# Patient Record
Sex: FEMALE | Race: WHITE | Employment: FULL TIME | URBAN - METROPOLITAN AREA
[De-identification: names, ages, dates, MRNs, and addresses within clinical notes are randomized per-mention and may not be internally consistent; named-entity substitution may affect disease eponyms.]

---

## 2022-10-14 ENCOUNTER — HOSPITAL ENCOUNTER (INPATIENT)
Age: 42
LOS: 1 days | Discharge: HOME | DRG: 343 | End: 2022-10-15
Attending: STUDENT IN AN ORGANIZED HEALTH CARE EDUCATION/TRAINING PROGRAM | Admitting: SURGERY
Payer: COMMERCIAL

## 2022-10-14 DIAGNOSIS — K35.80 ACUTE APPENDICITIS, UNSPECIFIED ACUTE APPENDICITIS TYPE: Primary | ICD-10-CM

## 2022-10-14 LAB
BASOPHILS ABSOLUTE: 0 K/UL (ref 0–0.2)
BASOPHILS RELATIVE PERCENT: 0.2 %
EOSINOPHILS ABSOLUTE: 0.1 K/UL (ref 0–0.6)
EOSINOPHILS RELATIVE PERCENT: 1 %
HCT VFR BLD CALC: 40.2 % (ref 36–48)
HEMOGLOBIN: 13.3 G/DL (ref 12–16)
LYMPHOCYTES ABSOLUTE: 1.5 K/UL (ref 1–5.1)
LYMPHOCYTES RELATIVE PERCENT: 11.3 %
MCH RBC QN AUTO: 29.8 PG (ref 26–34)
MCHC RBC AUTO-ENTMCNC: 32.9 G/DL (ref 31–36)
MCV RBC AUTO: 90.4 FL (ref 80–100)
MONOCYTES ABSOLUTE: 0.6 K/UL (ref 0–1.3)
MONOCYTES RELATIVE PERCENT: 4.6 %
NEUTROPHILS ABSOLUTE: 10.8 K/UL (ref 1.7–7.7)
NEUTROPHILS RELATIVE PERCENT: 82.9 %
PDW BLD-RTO: 12.9 % (ref 12.4–15.4)
PLATELET # BLD: 235 K/UL (ref 135–450)
PMV BLD AUTO: 9.3 FL (ref 5–10.5)
RBC # BLD: 4.45 M/UL (ref 4–5.2)
WBC # BLD: 13.1 K/UL (ref 4–11)

## 2022-10-14 PROCEDURE — 96375 TX/PRO/DX INJ NEW DRUG ADDON: CPT

## 2022-10-14 PROCEDURE — 84703 CHORIONIC GONADOTROPIN ASSAY: CPT

## 2022-10-14 PROCEDURE — 6360000002 HC RX W HCPCS: Performed by: STUDENT IN AN ORGANIZED HEALTH CARE EDUCATION/TRAINING PROGRAM

## 2022-10-14 PROCEDURE — 96374 THER/PROPH/DIAG INJ IV PUSH: CPT

## 2022-10-14 PROCEDURE — 2580000003 HC RX 258: Performed by: STUDENT IN AN ORGANIZED HEALTH CARE EDUCATION/TRAINING PROGRAM

## 2022-10-14 PROCEDURE — 80048 BASIC METABOLIC PNL TOTAL CA: CPT

## 2022-10-14 PROCEDURE — 85025 COMPLETE CBC W/AUTO DIFF WBC: CPT

## 2022-10-14 PROCEDURE — 99285 EMERGENCY DEPT VISIT HI MDM: CPT

## 2022-10-14 RX ORDER — ONDANSETRON 2 MG/ML
4 INJECTION INTRAMUSCULAR; INTRAVENOUS ONCE
Status: COMPLETED | OUTPATIENT
Start: 2022-10-15 | End: 2022-10-15

## 2022-10-14 RX ORDER — SODIUM CHLORIDE, SODIUM LACTATE, POTASSIUM CHLORIDE, AND CALCIUM CHLORIDE .6; .31; .03; .02 G/100ML; G/100ML; G/100ML; G/100ML
1000 INJECTION, SOLUTION INTRAVENOUS ONCE
Status: COMPLETED | OUTPATIENT
Start: 2022-10-14 | End: 2022-10-15

## 2022-10-14 RX ORDER — ONDANSETRON 2 MG/ML
4 INJECTION INTRAMUSCULAR; INTRAVENOUS ONCE
Status: COMPLETED | OUTPATIENT
Start: 2022-10-14 | End: 2022-10-14

## 2022-10-14 RX ADMIN — ONDANSETRON 4 MG: 2 INJECTION INTRAMUSCULAR; INTRAVENOUS at 23:29

## 2022-10-14 RX ADMIN — SODIUM CHLORIDE, POTASSIUM CHLORIDE, SODIUM LACTATE AND CALCIUM CHLORIDE 1000 ML: 600; 310; 30; 20 INJECTION, SOLUTION INTRAVENOUS at 23:31

## 2022-10-14 RX ADMIN — HYDROMORPHONE HYDROCHLORIDE 1 MG: 1 INJECTION, SOLUTION INTRAMUSCULAR; INTRAVENOUS; SUBCUTANEOUS at 23:30

## 2022-10-14 ASSESSMENT — PAIN DESCRIPTION - LOCATION
LOCATION: ABDOMEN

## 2022-10-14 ASSESSMENT — PAIN DESCRIPTION - PAIN TYPE
TYPE: ACUTE PAIN
TYPE: ACUTE PAIN

## 2022-10-14 ASSESSMENT — PAIN SCALES - GENERAL
PAINLEVEL_OUTOF10: 10
PAINLEVEL_OUTOF10: 5
PAINLEVEL_OUTOF10: 10

## 2022-10-14 ASSESSMENT — PAIN DESCRIPTION - DESCRIPTORS
DESCRIPTORS: DULL
DESCRIPTORS: PRESSURE
DESCRIPTORS: ACHING

## 2022-10-14 ASSESSMENT — PAIN DESCRIPTION - FREQUENCY
FREQUENCY: CONTINUOUS
FREQUENCY: CONTINUOUS

## 2022-10-14 ASSESSMENT — PAIN - FUNCTIONAL ASSESSMENT: PAIN_FUNCTIONAL_ASSESSMENT: 0-10

## 2022-10-15 ENCOUNTER — APPOINTMENT (OUTPATIENT)
Dept: CT IMAGING | Age: 42
DRG: 343 | End: 2022-10-15
Payer: COMMERCIAL

## 2022-10-15 ENCOUNTER — ANESTHESIA (OUTPATIENT)
Dept: OPERATING ROOM | Age: 42
DRG: 343 | End: 2022-10-15
Payer: COMMERCIAL

## 2022-10-15 ENCOUNTER — ANESTHESIA EVENT (OUTPATIENT)
Dept: OPERATING ROOM | Age: 42
DRG: 343 | End: 2022-10-15
Payer: COMMERCIAL

## 2022-10-15 VITALS
WEIGHT: 135 LBS | BODY MASS INDEX: 21.69 KG/M2 | OXYGEN SATURATION: 97 % | HEART RATE: 65 BPM | HEIGHT: 66 IN | SYSTOLIC BLOOD PRESSURE: 98 MMHG | RESPIRATION RATE: 14 BRPM | DIASTOLIC BLOOD PRESSURE: 63 MMHG | TEMPERATURE: 98.2 F

## 2022-10-15 PROBLEM — K35.80 ACUTE APPENDICITIS: Status: ACTIVE | Noted: 2022-10-15

## 2022-10-15 LAB
ALBUMIN SERPL-MCNC: 3.8 G/DL (ref 3.4–5)
ANION GAP SERPL CALCULATED.3IONS-SCNC: 12 MMOL/L (ref 3–16)
ANION GAP SERPL CALCULATED.3IONS-SCNC: 9 MMOL/L (ref 3–16)
BASOPHILS ABSOLUTE: 0 K/UL (ref 0–0.2)
BASOPHILS RELATIVE PERCENT: 0.1 %
BILIRUBIN URINE: NEGATIVE
BLOOD, URINE: NEGATIVE
BUN BLDV-MCNC: 11 MG/DL (ref 7–20)
BUN BLDV-MCNC: 13 MG/DL (ref 7–20)
CALCIUM SERPL-MCNC: 8.9 MG/DL (ref 8.3–10.6)
CALCIUM SERPL-MCNC: 9.3 MG/DL (ref 8.3–10.6)
CHLORIDE BLD-SCNC: 103 MMOL/L (ref 99–110)
CHLORIDE BLD-SCNC: 105 MMOL/L (ref 99–110)
CLARITY: CLEAR
CO2: 24 MMOL/L (ref 21–32)
CO2: 26 MMOL/L (ref 21–32)
COLOR: YELLOW
CREAT SERPL-MCNC: 0.7 MG/DL (ref 0.6–1.1)
CREAT SERPL-MCNC: 0.8 MG/DL (ref 0.6–1.1)
EOSINOPHILS ABSOLUTE: 0 K/UL (ref 0–0.6)
EOSINOPHILS RELATIVE PERCENT: 0 %
GFR AFRICAN AMERICAN: >60
GFR AFRICAN AMERICAN: >60
GFR NON-AFRICAN AMERICAN: >60
GFR NON-AFRICAN AMERICAN: >60
GLUCOSE BLD-MCNC: 120 MG/DL (ref 70–99)
GLUCOSE BLD-MCNC: 127 MG/DL (ref 70–99)
GLUCOSE URINE: NEGATIVE MG/DL
HCG QUALITATIVE: NEGATIVE
HCT VFR BLD CALC: 39.1 % (ref 36–48)
HEMOGLOBIN: 12.9 G/DL (ref 12–16)
KETONES, URINE: NEGATIVE MG/DL
LEUKOCYTE ESTERASE, URINE: NEGATIVE
LYMPHOCYTES ABSOLUTE: 0.5 K/UL (ref 1–5.1)
LYMPHOCYTES RELATIVE PERCENT: 4.3 %
MAGNESIUM: 1.8 MG/DL (ref 1.8–2.4)
MCH RBC QN AUTO: 29.8 PG (ref 26–34)
MCHC RBC AUTO-ENTMCNC: 33 G/DL (ref 31–36)
MCV RBC AUTO: 90.3 FL (ref 80–100)
MICROSCOPIC EXAMINATION: NORMAL
MONOCYTES ABSOLUTE: 0.4 K/UL (ref 0–1.3)
MONOCYTES RELATIVE PERCENT: 3.5 %
NEUTROPHILS ABSOLUTE: 10.8 K/UL (ref 1.7–7.7)
NEUTROPHILS RELATIVE PERCENT: 92.1 %
NITRITE, URINE: NEGATIVE
PDW BLD-RTO: 13.2 % (ref 12.4–15.4)
PH UA: 6.5 (ref 5–8)
PHOSPHORUS: 4.1 MG/DL (ref 2.5–4.9)
PLATELET # BLD: 201 K/UL (ref 135–450)
PMV BLD AUTO: 9.2 FL (ref 5–10.5)
POTASSIUM REFLEX MAGNESIUM: 4.2 MMOL/L (ref 3.5–5.1)
POTASSIUM SERPL-SCNC: 4.6 MMOL/L (ref 3.5–5.1)
PROTEIN UA: NEGATIVE MG/DL
RBC # BLD: 4.34 M/UL (ref 4–5.2)
SODIUM BLD-SCNC: 139 MMOL/L (ref 136–145)
SODIUM BLD-SCNC: 140 MMOL/L (ref 136–145)
SPECIFIC GRAVITY UA: 1.01 (ref 1–1.03)
URINE REFLEX TO CULTURE: NORMAL
URINE TYPE: NORMAL
UROBILINOGEN, URINE: 0.2 E.U./DL
WBC # BLD: 11.7 K/UL (ref 4–11)

## 2022-10-15 PROCEDURE — 0DTJ4ZZ RESECTION OF APPENDIX, PERCUTANEOUS ENDOSCOPIC APPROACH: ICD-10-PCS | Performed by: SURGERY

## 2022-10-15 PROCEDURE — 2500000003 HC RX 250 WO HCPCS: Performed by: FAMILY MEDICINE

## 2022-10-15 PROCEDURE — G0378 HOSPITAL OBSERVATION PER HR: HCPCS

## 2022-10-15 PROCEDURE — 6360000002 HC RX W HCPCS: Performed by: FAMILY MEDICINE

## 2022-10-15 PROCEDURE — 2709999900 HC NON-CHARGEABLE SUPPLY: Performed by: SURGERY

## 2022-10-15 PROCEDURE — 6360000002 HC RX W HCPCS: Performed by: STUDENT IN AN ORGANIZED HEALTH CARE EDUCATION/TRAINING PROGRAM

## 2022-10-15 PROCEDURE — 6370000000 HC RX 637 (ALT 250 FOR IP): Performed by: ANESTHESIOLOGY

## 2022-10-15 PROCEDURE — 6360000002 HC RX W HCPCS

## 2022-10-15 PROCEDURE — 44970 LAPAROSCOPY APPENDECTOMY: CPT | Performed by: SURGERY

## 2022-10-15 PROCEDURE — 3600000004 HC SURGERY LEVEL 4 BASE: Performed by: SURGERY

## 2022-10-15 PROCEDURE — 94150 VITAL CAPACITY TEST: CPT

## 2022-10-15 PROCEDURE — 6360000002 HC RX W HCPCS: Performed by: ANESTHESIOLOGY

## 2022-10-15 PROCEDURE — 81003 URINALYSIS AUTO W/O SCOPE: CPT

## 2022-10-15 PROCEDURE — 96372 THER/PROPH/DIAG INJ SC/IM: CPT

## 2022-10-15 PROCEDURE — 3600000014 HC SURGERY LEVEL 4 ADDTL 15MIN: Performed by: SURGERY

## 2022-10-15 PROCEDURE — 88304 TISSUE EXAM BY PATHOLOGIST: CPT

## 2022-10-15 PROCEDURE — 6370000000 HC RX 637 (ALT 250 FOR IP): Performed by: FAMILY MEDICINE

## 2022-10-15 PROCEDURE — 6360000004 HC RX CONTRAST MEDICATION

## 2022-10-15 PROCEDURE — 1200000000 HC SEMI PRIVATE

## 2022-10-15 PROCEDURE — 3700000000 HC ANESTHESIA ATTENDED CARE: Performed by: SURGERY

## 2022-10-15 PROCEDURE — 2580000003 HC RX 258: Performed by: SURGERY

## 2022-10-15 PROCEDURE — 74177 CT ABD & PELVIS W/CONTRAST: CPT

## 2022-10-15 PROCEDURE — 80069 RENAL FUNCTION PANEL: CPT

## 2022-10-15 PROCEDURE — 96365 THER/PROPH/DIAG IV INF INIT: CPT

## 2022-10-15 PROCEDURE — A4217 STERILE WATER/SALINE, 500 ML: HCPCS | Performed by: SURGERY

## 2022-10-15 PROCEDURE — 6360000002 HC RX W HCPCS: Performed by: SURGERY

## 2022-10-15 PROCEDURE — 96376 TX/PRO/DX INJ SAME DRUG ADON: CPT

## 2022-10-15 PROCEDURE — 83735 ASSAY OF MAGNESIUM: CPT

## 2022-10-15 PROCEDURE — 85025 COMPLETE CBC W/AUTO DIFF WBC: CPT

## 2022-10-15 PROCEDURE — 3700000001 HC ADD 15 MINUTES (ANESTHESIA): Performed by: SURGERY

## 2022-10-15 PROCEDURE — 2580000003 HC RX 258: Performed by: STUDENT IN AN ORGANIZED HEALTH CARE EDUCATION/TRAINING PROGRAM

## 2022-10-15 PROCEDURE — 36415 COLL VENOUS BLD VENIPUNCTURE: CPT

## 2022-10-15 PROCEDURE — 7100000001 HC PACU RECOVERY - ADDTL 15 MIN: Performed by: SURGERY

## 2022-10-15 PROCEDURE — 7100000000 HC PACU RECOVERY - FIRST 15 MIN: Performed by: SURGERY

## 2022-10-15 PROCEDURE — 2500000003 HC RX 250 WO HCPCS: Performed by: SURGERY

## 2022-10-15 PROCEDURE — 99223 1ST HOSP IP/OBS HIGH 75: CPT | Performed by: SURGERY

## 2022-10-15 RX ORDER — SODIUM CHLORIDE 9 MG/ML
INJECTION, SOLUTION INTRAVENOUS PRN
Status: DISCONTINUED | OUTPATIENT
Start: 2022-10-15 | End: 2022-10-15 | Stop reason: HOSPADM

## 2022-10-15 RX ORDER — OXYCODONE HYDROCHLORIDE 5 MG/1
5 TABLET ORAL PRN
Status: CANCELLED | OUTPATIENT
Start: 2022-10-15 | End: 2022-10-15

## 2022-10-15 RX ORDER — SCOLOPAMINE TRANSDERMAL SYSTEM 1 MG/1
1 PATCH, EXTENDED RELEASE TRANSDERMAL ONCE
Status: DISCONTINUED | OUTPATIENT
Start: 2022-10-15 | End: 2022-10-15 | Stop reason: HOSPADM

## 2022-10-15 RX ORDER — LIDOCAINE HYDROCHLORIDE 20 MG/ML
INJECTION, SOLUTION INTRAVENOUS PRN
Status: DISCONTINUED | OUTPATIENT
Start: 2022-10-15 | End: 2022-10-15 | Stop reason: SDUPTHER

## 2022-10-15 RX ORDER — PROCHLORPERAZINE EDISYLATE 5 MG/ML
5 INJECTION INTRAMUSCULAR; INTRAVENOUS
Status: CANCELLED | OUTPATIENT
Start: 2022-10-15 | End: 2022-10-16

## 2022-10-15 RX ORDER — OXYCODONE HYDROCHLORIDE 5 MG/1
2.5 TABLET ORAL EVERY 6 HOURS PRN
Qty: 10 TABLET | Refills: 0 | Status: SHIPPED | OUTPATIENT
Start: 2022-10-15 | End: 2022-10-20

## 2022-10-15 RX ORDER — MEPERIDINE HYDROCHLORIDE 25 MG/ML
12.5 INJECTION INTRAMUSCULAR; INTRAVENOUS; SUBCUTANEOUS EVERY 5 MIN PRN
Status: DISCONTINUED | OUTPATIENT
Start: 2022-10-15 | End: 2022-10-15 | Stop reason: HOSPADM

## 2022-10-15 RX ORDER — ONDANSETRON 2 MG/ML
4 INJECTION INTRAMUSCULAR; INTRAVENOUS EVERY 6 HOURS PRN
Status: DISCONTINUED | OUTPATIENT
Start: 2022-10-15 | End: 2022-10-15 | Stop reason: HOSPADM

## 2022-10-15 RX ORDER — BUPIVACAINE HYDROCHLORIDE 5 MG/ML
INJECTION, SOLUTION EPIDURAL; INTRACAUDAL PRN
Status: DISCONTINUED | OUTPATIENT
Start: 2022-10-15 | End: 2022-10-15 | Stop reason: ALTCHOICE

## 2022-10-15 RX ORDER — SODIUM CHLORIDE 0.9 % (FLUSH) 0.9 %
5-40 SYRINGE (ML) INJECTION PRN
Status: CANCELLED | OUTPATIENT
Start: 2022-10-15

## 2022-10-15 RX ORDER — OXYCODONE HYDROCHLORIDE 5 MG/1
10 TABLET ORAL PRN
Status: CANCELLED | OUTPATIENT
Start: 2022-10-15 | End: 2022-10-15

## 2022-10-15 RX ORDER — SODIUM CHLORIDE 9 MG/ML
25 INJECTION, SOLUTION INTRAVENOUS PRN
Status: CANCELLED | OUTPATIENT
Start: 2022-10-15

## 2022-10-15 RX ORDER — LORAZEPAM 2 MG/ML
0.5 INJECTION INTRAMUSCULAR
Status: CANCELLED | OUTPATIENT
Start: 2022-10-15 | End: 2022-10-16

## 2022-10-15 RX ORDER — ROCURONIUM BROMIDE 10 MG/ML
INJECTION, SOLUTION INTRAVENOUS PRN
Status: DISCONTINUED | OUTPATIENT
Start: 2022-10-15 | End: 2022-10-15 | Stop reason: SDUPTHER

## 2022-10-15 RX ORDER — MEPERIDINE HYDROCHLORIDE 25 MG/ML
12.5 INJECTION INTRAMUSCULAR; INTRAVENOUS; SUBCUTANEOUS EVERY 5 MIN PRN
Status: CANCELLED | OUTPATIENT
Start: 2022-10-15

## 2022-10-15 RX ORDER — ENOXAPARIN SODIUM 100 MG/ML
40 INJECTION SUBCUTANEOUS DAILY
Status: DISCONTINUED | OUTPATIENT
Start: 2022-10-15 | End: 2022-10-15 | Stop reason: HOSPADM

## 2022-10-15 RX ORDER — OXYCODONE HYDROCHLORIDE 5 MG/1
2.5 TABLET ORAL EVERY 4 HOURS PRN
Status: DISCONTINUED | OUTPATIENT
Start: 2022-10-15 | End: 2022-10-15 | Stop reason: HOSPADM

## 2022-10-15 RX ORDER — SODIUM CHLORIDE, SODIUM LACTATE, POTASSIUM CHLORIDE, CALCIUM CHLORIDE 600; 310; 30; 20 MG/100ML; MG/100ML; MG/100ML; MG/100ML
INJECTION, SOLUTION INTRAVENOUS CONTINUOUS
Status: DISCONTINUED | OUTPATIENT
Start: 2022-10-15 | End: 2022-10-15 | Stop reason: HOSPADM

## 2022-10-15 RX ORDER — PROPOFOL 10 MG/ML
INJECTION, EMULSION INTRAVENOUS PRN
Status: DISCONTINUED | OUTPATIENT
Start: 2022-10-15 | End: 2022-10-15 | Stop reason: SDUPTHER

## 2022-10-15 RX ORDER — ONDANSETRON 4 MG/1
4 TABLET, ORALLY DISINTEGRATING ORAL EVERY 8 HOURS PRN
Qty: 9 TABLET | Refills: 0 | Status: SHIPPED | OUTPATIENT
Start: 2022-10-15 | End: 2022-10-18

## 2022-10-15 RX ORDER — HEPARIN SODIUM 5000 [USP'U]/ML
5000 INJECTION, SOLUTION INTRAVENOUS; SUBCUTANEOUS ONCE
Status: DISCONTINUED | OUTPATIENT
Start: 2022-10-15 | End: 2022-10-15 | Stop reason: SDUPTHER

## 2022-10-15 RX ORDER — DIPHENHYDRAMINE HYDROCHLORIDE 50 MG/ML
12.5 INJECTION INTRAMUSCULAR; INTRAVENOUS
Status: CANCELLED | OUTPATIENT
Start: 2022-10-15 | End: 2022-10-16

## 2022-10-15 RX ORDER — SUCCINYLCHOLINE/SOD CL,ISO/PF 200MG/10ML
SYRINGE (ML) INTRAVENOUS PRN
Status: DISCONTINUED | OUTPATIENT
Start: 2022-10-15 | End: 2022-10-15 | Stop reason: SDUPTHER

## 2022-10-15 RX ORDER — FENTANYL CITRATE 50 UG/ML
25 INJECTION, SOLUTION INTRAMUSCULAR; INTRAVENOUS EVERY 5 MIN PRN
Status: CANCELLED | OUTPATIENT
Start: 2022-10-15

## 2022-10-15 RX ORDER — DEXAMETHASONE SODIUM PHOSPHATE 4 MG/ML
INJECTION, SOLUTION INTRA-ARTICULAR; INTRALESIONAL; INTRAMUSCULAR; INTRAVENOUS; SOFT TISSUE PRN
Status: DISCONTINUED | OUTPATIENT
Start: 2022-10-15 | End: 2022-10-15 | Stop reason: SDUPTHER

## 2022-10-15 RX ORDER — SODIUM CHLORIDE 0.9 % (FLUSH) 0.9 %
5-40 SYRINGE (ML) INJECTION EVERY 12 HOURS SCHEDULED
Status: DISCONTINUED | OUTPATIENT
Start: 2022-10-15 | End: 2022-10-15 | Stop reason: HOSPADM

## 2022-10-15 RX ORDER — OXYCODONE HYDROCHLORIDE 5 MG/1
5 TABLET ORAL EVERY 4 HOURS PRN
Status: DISCONTINUED | OUTPATIENT
Start: 2022-10-15 | End: 2022-10-15 | Stop reason: HOSPADM

## 2022-10-15 RX ORDER — LABETALOL HYDROCHLORIDE 5 MG/ML
10 INJECTION, SOLUTION INTRAVENOUS
Status: CANCELLED | OUTPATIENT
Start: 2022-10-15

## 2022-10-15 RX ORDER — PROMETHAZINE HYDROCHLORIDE 25 MG/1
12.5 TABLET ORAL EVERY 6 HOURS PRN
Status: DISCONTINUED | OUTPATIENT
Start: 2022-10-15 | End: 2022-10-15 | Stop reason: HOSPADM

## 2022-10-15 RX ORDER — ONDANSETRON 2 MG/ML
4 INJECTION INTRAMUSCULAR; INTRAVENOUS
Status: CANCELLED | OUTPATIENT
Start: 2022-10-15 | End: 2022-10-16

## 2022-10-15 RX ORDER — ONDANSETRON 2 MG/ML
INJECTION INTRAMUSCULAR; INTRAVENOUS PRN
Status: DISCONTINUED | OUTPATIENT
Start: 2022-10-15 | End: 2022-10-15 | Stop reason: SDUPTHER

## 2022-10-15 RX ORDER — SODIUM CHLORIDE 0.9 % (FLUSH) 0.9 %
5-40 SYRINGE (ML) INJECTION EVERY 12 HOURS SCHEDULED
Status: CANCELLED | OUTPATIENT
Start: 2022-10-15

## 2022-10-15 RX ORDER — ACETAMINOPHEN 500 MG
1000 TABLET ORAL EVERY 6 HOURS
Status: DISCONTINUED | OUTPATIENT
Start: 2022-10-15 | End: 2022-10-15 | Stop reason: HOSPADM

## 2022-10-15 RX ORDER — SODIUM CHLORIDE 9 MG/ML
INJECTION, SOLUTION INTRAVENOUS CONTINUOUS
Status: CANCELLED | OUTPATIENT
Start: 2022-10-15

## 2022-10-15 RX ORDER — FENTANYL CITRATE 50 UG/ML
INJECTION, SOLUTION INTRAMUSCULAR; INTRAVENOUS PRN
Status: DISCONTINUED | OUTPATIENT
Start: 2022-10-15 | End: 2022-10-15 | Stop reason: SDUPTHER

## 2022-10-15 RX ORDER — MIDAZOLAM HYDROCHLORIDE 1 MG/ML
INJECTION INTRAMUSCULAR; INTRAVENOUS PRN
Status: DISCONTINUED | OUTPATIENT
Start: 2022-10-15 | End: 2022-10-15 | Stop reason: SDUPTHER

## 2022-10-15 RX ORDER — ONDANSETRON 4 MG/1
4 TABLET, ORALLY DISINTEGRATING ORAL EVERY 8 HOURS PRN
Status: DISCONTINUED | OUTPATIENT
Start: 2022-10-15 | End: 2022-10-15 | Stop reason: HOSPADM

## 2022-10-15 RX ORDER — SODIUM CHLORIDE 0.9 % (FLUSH) 0.9 %
5-40 SYRINGE (ML) INJECTION PRN
Status: DISCONTINUED | OUTPATIENT
Start: 2022-10-15 | End: 2022-10-15 | Stop reason: HOSPADM

## 2022-10-15 RX ORDER — ONDANSETRON 2 MG/ML
4 INJECTION INTRAMUSCULAR; INTRAVENOUS ONCE
Status: COMPLETED | OUTPATIENT
Start: 2022-10-15 | End: 2022-10-15

## 2022-10-15 RX ORDER — SODIUM CHLORIDE 0.9 % (FLUSH) 0.9 %
10 SYRINGE (ML) INJECTION EVERY 12 HOURS SCHEDULED
Status: DISCONTINUED | OUTPATIENT
Start: 2022-10-15 | End: 2022-10-15 | Stop reason: HOSPADM

## 2022-10-15 RX ORDER — PROCHLORPERAZINE EDISYLATE 5 MG/ML
10 INJECTION INTRAMUSCULAR; INTRAVENOUS EVERY 6 HOURS PRN
Status: DISCONTINUED | OUTPATIENT
Start: 2022-10-15 | End: 2022-10-15 | Stop reason: HOSPADM

## 2022-10-15 RX ORDER — MAGNESIUM HYDROXIDE 1200 MG/15ML
LIQUID ORAL CONTINUOUS PRN
Status: COMPLETED | OUTPATIENT
Start: 2022-10-15 | End: 2022-10-15

## 2022-10-15 RX ORDER — SODIUM CHLORIDE 0.9 % (FLUSH) 0.9 %
10 SYRINGE (ML) INJECTION PRN
Status: DISCONTINUED | OUTPATIENT
Start: 2022-10-15 | End: 2022-10-15 | Stop reason: HOSPADM

## 2022-10-15 RX ADMIN — PROCHLORPERAZINE EDISYLATE 10 MG: 5 INJECTION INTRAMUSCULAR; INTRAVENOUS at 04:12

## 2022-10-15 RX ADMIN — SODIUM CHLORIDE, POTASSIUM CHLORIDE, SODIUM LACTATE AND CALCIUM CHLORIDE: 600; 310; 30; 20 INJECTION, SOLUTION INTRAVENOUS at 03:32

## 2022-10-15 RX ADMIN — HYDROMORPHONE HYDROCHLORIDE 0.5 MG: 1 INJECTION, SOLUTION INTRAMUSCULAR; INTRAVENOUS; SUBCUTANEOUS at 02:49

## 2022-10-15 RX ADMIN — LIDOCAINE HYDROCHLORIDE 100 MG: 20 INJECTION, SOLUTION INTRAVENOUS at 08:47

## 2022-10-15 RX ADMIN — PROPOFOL 120 MG: 10 INJECTION, EMULSION INTRAVENOUS at 08:48

## 2022-10-15 RX ADMIN — MIDAZOLAM HYDROCHLORIDE 2 MG: 2 INJECTION, SOLUTION INTRAMUSCULAR; INTRAVENOUS at 08:46

## 2022-10-15 RX ADMIN — ONDANSETRON 4 MG: 2 INJECTION INTRAMUSCULAR; INTRAVENOUS at 08:53

## 2022-10-15 RX ADMIN — PIPERACILLIN AND TAZOBACTAM 3375 MG: 3; .375 INJECTION, POWDER, FOR SOLUTION INTRAVENOUS at 11:13

## 2022-10-15 RX ADMIN — FENTANYL CITRATE 50 MCG: 50 INJECTION, SOLUTION INTRAMUSCULAR; INTRAVENOUS at 09:21

## 2022-10-15 RX ADMIN — ONDANSETRON 4 MG: 2 INJECTION INTRAMUSCULAR; INTRAVENOUS at 00:07

## 2022-10-15 RX ADMIN — PIPERACILLIN AND TAZOBACTAM 3375 MG: 3; .375 INJECTION, POWDER, FOR SOLUTION INTRAVENOUS at 01:48

## 2022-10-15 RX ADMIN — ENOXAPARIN SODIUM 40 MG: 100 INJECTION SUBCUTANEOUS at 11:13

## 2022-10-15 RX ADMIN — ROCURONIUM BROMIDE 30 MG: 10 INJECTION INTRAVENOUS at 08:53

## 2022-10-15 RX ADMIN — MEPERIDINE HYDROCHLORIDE 12.5 MG: 25 INJECTION INTRAMUSCULAR; INTRAVENOUS; SUBCUTANEOUS at 10:15

## 2022-10-15 RX ADMIN — DEXAMETHASONE SODIUM PHOSPHATE 4 MG: 4 INJECTION, SOLUTION INTRAMUSCULAR; INTRAVENOUS at 08:53

## 2022-10-15 RX ADMIN — FENTANYL CITRATE 50 MCG: 50 INJECTION, SOLUTION INTRAMUSCULAR; INTRAVENOUS at 08:46

## 2022-10-15 RX ADMIN — SUGAMMADEX 200 MG: 100 INJECTION, SOLUTION INTRAVENOUS at 09:20

## 2022-10-15 RX ADMIN — IOPAMIDOL 5 ML: 755 INJECTION, SOLUTION INTRAVENOUS at 00:35

## 2022-10-15 RX ADMIN — ONDANSETRON 4 MG: 2 INJECTION INTRAMUSCULAR; INTRAVENOUS at 02:11

## 2022-10-15 RX ADMIN — Medication 100 MG: at 08:48

## 2022-10-15 ASSESSMENT — PAIN DESCRIPTION - DESCRIPTORS
DESCRIPTORS: ACHING
DESCRIPTORS: ACHING

## 2022-10-15 ASSESSMENT — PAIN DESCRIPTION - LOCATION
LOCATION: ABDOMEN

## 2022-10-15 ASSESSMENT — PAIN SCALES - GENERAL
PAINLEVEL_OUTOF10: 0
PAINLEVEL_OUTOF10: 7
PAINLEVEL_OUTOF10: 3
PAINLEVEL_OUTOF10: 3
PAINLEVEL_OUTOF10: 0
PAINLEVEL_OUTOF10: 4

## 2022-10-15 NOTE — PROGRESS NOTES
4 Eyes Admission Assessment     I agree as the admission nurse that 2 RN's have performed a thorough Head to Toe Skin Assessment on the patient. ALL assessment sites listed below have been assessed on admission. Areas assessed by both nurses:   [x]   Head, Face, and Ears   [x]   Shoulders, Back, and Chest  [x]   Arms, Elbows, and Hands   [x]   Coccyx, Sacrum, and Ischium  [x]   Legs, Feet, and Heels        Does the Patient have Skin Breakdown?   No         Raymon Prevention initiated:  No   Wound Care Orders initiated:  No      [facility] nurse consulted for Pressure Injury (Stage 3,4, Unstageable, DTI, NWPT, and Complex wounds) or Raymon score 18 or lower:  No      Nurse 1 eSignature: Electronically signed by Aiden Aragon RN on 10/15/22 at 4:33 AM EDT        Nurse 2 eSignature: Electronically signed by Kartik Goodman RN on 10/15/22 at 4:35 AM EDT

## 2022-10-15 NOTE — ED NOTES
Attempted to call report, nurse unable to take report at this time.       Viry Gunter, RN  10/15/22 1372

## 2022-10-15 NOTE — PROGRESS NOTES
VSS, A&O, pt's nausea was managed with compazine. Pt denies pain, no vomiting. IVF infusing, pt has been NPO, ambulatory and independent. Call light within reach. Uneventful night with pt.

## 2022-10-15 NOTE — BRIEF OP NOTE
Brief Postoperative Note      Patient: Nitesh Godoy  YOB: 1980  MRN: [id number]    Date of Procedure: 10/15/2022    Pre-Op Diagnosis: Acute appendicitis    Post-Op Diagnosis: Same       Procedure(s):  APPENDECTOMY LAPAROSCOPIC    Surgeon(s):  Jagjit Rodriguez MD    Assistant:  Resident: Flora London MD    Anesthesia: General    Estimated Blood Loss (mL): Minimal    Complications: None    Specimens:   ID Type Source Tests Collected by Time Destination   A : APPENDIX Tissue Tissue SURGICAL PATHOLOGY Jagjit Rodriguez MD 10/15/2022 0080        Implants:  * No implants in log *      Drains:   NG/OG/NJ/NE Tube Orogastric 18 fr Center mouth (Active)       Findings: Mildly inflamed appendix; right-sided ovarian cyst present, moderate amount of pelvic fluid    Electronically signed by Flora London MD on 10/15/2022 at 9:35 AM

## 2022-10-15 NOTE — PROGRESS NOTES
Pt has been a&o x4 for shift. Pt V/s stable following appendectomy procedure. Pt denies any pain or nausea and voiding freely. Ambulating with steady gait. Pt d/c instructions and prescriptions reviewed and given to pt. PIV removed intact and w/o injury.  Pt left unit in w/c, transportation home provided by S/O.

## 2022-10-15 NOTE — ED PROVIDER NOTES
ED Attending Attestation Note     Date of evaluation: 10/14/2022    This patient was seen by the resident. I have seen and examined the patient, agree with the workup, evaluation, management and diagnosis. The care plan has been discussed. My assessment reveals 61-year-old female presenting with right lower quadrant abdominal pain that started about 2 hours ago. Patient with pain that she rates as a 10 out of 10. Its worse with movement and palpation. She has had some nausea but denies any vomiting at this time. She denies any fevers or chills. She states that her last bowel movement was earlier today. Patient with her last menstrual cycle a few weeks ago. On examination she has tenderness palpation in the periumbilical and right lower quadrant region. Upon arrival noted to be afebrile but significantly uncomfortable and will have blood work and cross-sectional imaging done to assess for appendicitis.      Valery Arenas MD  10/14/22 0843

## 2022-10-15 NOTE — ED PROVIDER NOTES
[address] [facility] RESIDENT NOTE       Date of evaluation: 10/14/2022    Chief Complaint     Abdominal Pain (Patient states sudden onset lower abd pain. Denies urinary/bowel sx. States pain severe pressure. )      History of Present Illness     Christopher Diaz is a 46 y.o. female who presents to the [facility] Ed with complaints of abdominal pain going on for the past 2 hrs. As per the patient she had her dinner and was doing well until about 2 hrs ago when she started having sharp pain in her lower abdomen associated with chills. She denies any nausea, vomiting, diarrhea or constipation, fevers. Her last menstrual cycle was about 3 weeks ago and she generally doesn't have pain associated with her periods. She denies any other acute complaints at this time. She denies eating outside or anyone having similar symptoms around her. Review of Systems     Review of Systems   All other systems reviewed and are negative. Past Medical, Surgical, Family, and Social History     She has no past medical history on file. She has a past surgical history that includes Thyroidectomy. Her family history is not on file. She reports that she has never smoked. She has never used smokeless tobacco. She reports that she does not use drugs. Medications     Previous Medications    No medications on file       Allergies     She has No Known Allergies. Physical Exam     INITIAL VITALS: BP: 120/86, Temp: 97.6 °F (36.4 °C), Heart Rate: 77, Resp: 19, SpO2: 100 %   Physical Exam  Constitutional:       Appearance: She is well-developed. HENT:      Head: Normocephalic and atraumatic. Mouth/Throat:      Mouth: Mucous membranes are moist.   Eyes:      Extraocular Movements: Extraocular movements intact. Pupils: Pupils are equal, round, and reactive to light. Cardiovascular:      Rate and Rhythm: Normal rate and regular rhythm.    Pulmonary:      Effort: Pulmonary effort is normal.      Breath sounds: Normal breath sounds. Abdominal:      General: Abdomen is flat. Palpations: Abdomen is soft. Tenderness: There is abdominal tenderness in the suprapubic area. Skin:     General: Skin is warm. Neurological:      General: No focal deficit present. Mental Status: She is alert. Psychiatric:         Mood and Affect: Mood normal.         Behavior: Behavior normal.       Diagnostic Results       RADIOLOGY:  CT ABDOMEN PELVIS W IV CONTRAST Additional Contrast? None    (Results Pending)       LABS:   Results for orders placed or performed during the hospital encounter of 10/14/22   CBC with Auto Differential   Result Value Ref Range    WBC 13.1 (H) 4.0 - 11.0 K/uL    RBC 4.45 4.00 - 5.20 M/uL    Hemoglobin 13.3 12.0 - 16.0 g/dL    Hematocrit 40.2 36.0 - 48.0 %    MCV 90.4 80.0 - 100.0 fL    MCH 29.8 26.0 - 34.0 pg    MCHC 32.9 31.0 - 36.0 g/dL    RDW 12.9 12.4 - 15.4 %    Platelets 925 547 - 707 K/uL    MPV 9.3 5.0 - 10.5 fL    Neutrophils % 82.9 %    Lymphocytes % 11.3 %    Monocytes % 4.6 %    Eosinophils % 1.0 %    Basophils % 0.2 %    Neutrophils Absolute 10.8 (H) 1.7 - 7.7 K/uL    Lymphocytes Absolute 1.5 1.0 - 5.1 K/uL    Monocytes Absolute 0.6 0.0 - 1.3 K/uL    Eosinophils Absolute 0.1 0.0 - 0.6 K/uL    Basophils Absolute 0.0 0.0 - 0.2 K/uL   BMP w/ Reflex to MG   Result Value Ref Range    Sodium 140 136 - 145 mmol/L    Potassium reflex Magnesium 4.2 3.5 - 5.1 mmol/L    Chloride 105 99 - 110 mmol/L    CO2 26 21 - 32 mmol/L    Anion Gap 9 3 - 16    Glucose 120 (H) 70 - 99 mg/dL    BUN 13 7 - 20 mg/dL    Creatinine 0.8 0.6 - 1.1 mg/dL    GFR Non-African American >60 >60    GFR African American >60 >60    Calcium 9.3 8.3 - 10.6 mg/dL   HCG Qualitative, Serum   Result Value Ref Range    hCG Qual Negative Detects HCG level >10 MIU/mL       ED BEDSIDE ULTRASOUND:  No results found.     RECENT VITALS:  BP: 124/83, Temp: 97.6 °F (36.4 °C),Heart Rate: 77, Resp: 19, SpO2: 100 % Procedures     None     ED Course     Nursing Notes, Past Medical Hx, Past Surgical Hx, Social Hx, Allergies, and FamilyHx were reviewed. The patient was giventhe following medications:  Orders Placed This Encounter   Medications    HYDROmorphone (DILAUDID) injection 1 mg    ondansetron (ZOFRAN) injection 4 mg    lactated ringers bolus    ondansetron (ZOFRAN) injection 4 mg    iopamidol (ISOVUE-370) 76 % injection 5 mL       CONSULTS:  None    MEDICAL DECISION MAKING / ASSESSMENT / Sonia Staff is a 46 y.o. female who presents to the [facility] Ed with complaints of abdominal pain going on for the past 2 hrs. As per the patient she had her dinner and was doing well until about 2 hrs ago when she started having sharp pain in her lower abdomen associated with chills. She denies any nausea, vomiting, diarrhea or constipation, fevers. Her last menstrual cycle was about 3 weeks ago and she generally doesn't have pain associated with her periods. She denies any other acute complaints at this time. She denies eating outside or anyone having similar symptoms around her. On arrival to the Ed patient is alert and oriented x 4. Vitals are stable and is afebrile. On my exam patient did have tenderness to palpation in the suprapubic and periumbilical area. Clear chest on auscultation, no flank tenderness or lower extremity edema. No neuro deficits noted. Labs including CBC, CMP, urine pregnancy test were unremarkable. Patient was given Zofran to help with nausea and pain medications along with bolus of fluids. CT abdomen pelvis w contrast was done to rule out intraabdominal pathology which is pending. At this time of shift change, Dr Fernanda Horton will follow up with results and decide on treatment and disposition of the patient. This patient was also evaluated by the attending physician. All care plans were discussed and agreed upon. Clinical Impression     1.  Periumbilical abdominal pain Disposition     PATIENT REFERRED TO:  No follow-up provider specified.     DISCHARGE MEDICATIONS:  New Prescriptions    No medications on file       Barby Eduardo MD  Resident  10/15/22 0103

## 2022-10-15 NOTE — PROGRESS NOTES
General Surgery  Post-operative Note      Procedure(s) Performed:   Laparoscopic appendectomy    Subjective:   Patient's pain is controlled, denies nausea or vomiting. Tolerating diet. OOB, ambulating and voiding appropriately. Feels comfortable going home. Objective:    Vitals:   Vitals:    10/15/22 1000 10/15/22 1015 10/15/22 1032 10/15/22 1232   BP: 113/75 111/73 95/62 98/63   Pulse: 64 70 75 65   Resp: 12 14 12 14   Temp:  97 °F (36.1 °C) 97.9 °F (36.6 °C) 98.2 °F (36.8 °C)   TempSrc:  Temporal Axillary Oral   SpO2: 100% 99% 97% 97%   Weight:       Height:           Physical Exam:  Post-op vital signs:  Stable   General appearance: no acute distress, pleasant demeanor   Eyes: No scleral icterus, EOM grossly intact  Neck: trachea midline, no JVD,  Chest/Lungs: Normal effort with no accessory muscle use on RA  Cardiovascular: RRR  Abdomen: Soft, appropriately-tender, incision c/d/i and well approximated with Dermabond  Skin: warm and dry, no rashes  Extremities: no edema, no cyanosis  Neuro: A&Ox3, no focal deficits, sensation intact    Assessment and Plan  This is a 46y.o. year old female status post laparoscopic appendectomy. 10/15. She is HDS with minimal pain, OOB, voiding, without n/v, thus suitable for d/c to home this afternoon.      Pain management: Oxycodone and acetaminophen PRN   Cardiovasc: hemodynamically stable  Respiratory: deep breathing  Fluids:  None Diet: General diet  : Urine output is adequate   Ambulation: ambulation as tolerates  Prophylaxis: none  Antibiotics: none  Wound: Local wound care    Marrian Lesches, DO   PGY1, General Surgery  10/15/22  3:54 PM  Pager # [phone]

## 2022-10-15 NOTE — DISCHARGE INSTRUCTIONS
Discharge Instructions:    Diet:   You may resume a regular diet. Wound Care:   Skin glue was used to cover your incision(s). Please note that this glue is tinted purple; therefore, a slight purple hue around your incisions is normal. The skin glue will fall off on its own in about 10 days. You may shower, but do not scrub the incision sites directly or soak (tub, pool, etc.). Activity:   No heavy lifting greater than a milk jug (~8lbs) until follow up, at which time you will receive further instruction. Pain management:   Unless informed of any restrictions by your primary care physician, please use your preferred over-the-counter pain reliever as your primary pain medication. If you have pain that persists despite over-the-counter pain medications, you have been provided with a prescription for an opioid/narcotic pain reliever (Oxycodone). No driving or operating machinery while taking opioid/narcotic medications. If you are not taking the opioid pain medication, then you can drive when you feel as though you can sit comfortably behind the steering wheel and can slam on the brake or turn the wheel sharply without it hurting. We recommend that you practice this while sitting the car with it parked in your driveway before trying to drive on the road. Reasons to Return:   Some soreness and redness is common after surgery, especially for the first 24-48 hours. If, however, you experience for increasing redness, worsening pain, new and/or increasing drainage from wound, fever above 101.5 degrees Farenheit, bleeding that does not stop soon after discovery, or any other concerns about your incision or post op course, please either call the office or call/return to the Emergency Department for further evaluation. Follow up with Dr. Mehdi Bonilla as needed in the future.  Please call the office to make an appointment or if you have any questions/concerns: [phone]  We recommend that you call your primary care physician within the first 3-5 days following discharge to inform them that you were recently hospitalized and potentially schedule a visit at their discretion.

## 2022-10-15 NOTE — ANESTHESIA PRE PROCEDURE
Department of Anesthesiology  Preprocedure Note       Name:  Keri Sylvester   Age:  46 y.o.  :  1980                                          MRN:  [id number]         Date:  10/15/2022      Surgeon: Shane Trevino):  Trevor Beckwith MD    Procedure: Procedure(s):  APPENDECTOMY LAPAROSCOPIC POSSIBLE OPEN    Medications prior to admission:   Prior to Admission medications    Not on File       Current medications:    Current Facility-Administered Medications   Medication Dose Route Frequency Provider Last Rate Last Admin    sodium chloride flush 0.9 % injection 10 mL  10 mL IntraVENous 2 times per day Blas Kidd MD        sodium chloride flush 0.9 % injection 10 mL  10 mL IntraVENous PRN Blas Kidd MD        0.9 % sodium chloride infusion   IntraVENous PRN Blas Kidd MD        ondansetron (ZOFRAN-ODT) disintegrating tablet 4 mg  4 mg Oral Q8H PRN Blas Kidd MD        Or    ondansetron (ZOFRAN) injection 4 mg  4 mg IntraVENous Q6H PRN Blas Kidd MD        enoxaparin (LOVENOX) injection 40 mg  40 mg SubCUTAneous Daily Blas Kidd MD        lactated ringers infusion   IntraVENous Continuous Blas Kidd  mL/hr at 10/15/22 0332 New Bag at 10/15/22 0332    HYDROmorphone (DILAUDID) injection 0.25 mg  0.25 mg IntraVENous Q3H PRN Blas Kidd MD        Or    HYDROmorphone (DILAUDID) injection 0.5 mg  0.5 mg IntraVENous Q3H PRN Blas Kidd MD   0.5 mg at 10/15/22 0249    piperacillin-tazobactam (ZOSYN) 3,375 mg in dextrose 5 % 50 mL IVPB extended infusion (mini-bag)  3,375 mg IntraVENous Q8H Blas Kidd MD        prochlorperazine (COMPAZINE) injection 10 mg  10 mg IntraVENous Q6H PRN Sheri Armas DO   10 mg at 10/15/22 6846       Allergies:  No Known Allergies    Problem List:    Patient Active Problem List   Diagnosis Code    Acute appendicitis K35.80       Past Medical History:  History reviewed. No pertinent past medical history.     Past Surgical History:        Procedure Laterality Date    THYROIDECTOMY      partial       Social History:    Social History     Tobacco Use    Smoking status: Never    Smokeless tobacco: Never   Substance Use Topics    Alcohol use: Not on file                                Counseling given: Not Answered      Vital Signs (Current):   Vitals:    10/15/22 0111 10/15/22 0323 10/15/22 0617 10/15/22 0721   BP: 124/77 110/72  (!) 101/59   Pulse:  78  81   Resp:  18  16   Temp:  97.9 °F (36.6 °C)  98.6 °F (37 °C)   TempSrc:  Oral  Oral   SpO2: 98% 97% 97% 96%   Weight:       Height:                                                  BP Readings from Last 3 Encounters:   10/15/22 (!) 101/59       NPO Status:                                                                                 BMI:   Wt Readings from Last 3 Encounters:   10/14/22 135 lb (61.2 kg)     Body mass index is 21.79 kg/m². CBC:   Lab Results   Component Value Date/Time    WBC 11.7 10/15/2022 06:03 AM    RBC 4.34 10/15/2022 06:03 AM    HGB 12.9 10/15/2022 06:03 AM    HCT 39.1 10/15/2022 06:03 AM    MCV 90.3 10/15/2022 06:03 AM    RDW 13.2 10/15/2022 06:03 AM     10/15/2022 06:03 AM       CMP:   Lab Results   Component Value Date/Time     10/15/2022 06:03 AM    K 4.6 10/15/2022 06:03 AM    K 4.2 10/14/2022 11:42 PM     10/15/2022 06:03 AM    CO2 24 10/15/2022 06:03 AM    BUN 11 10/15/2022 06:03 AM    CREATININE 0.7 10/15/2022 06:03 AM    GFRAA >60 10/15/2022 06:03 AM    LABGLOM >60 10/15/2022 06:03 AM    GLUCOSE 127 10/15/2022 06:03 AM    CALCIUM 8.9 10/15/2022 06:03 AM       POC Tests: No results for input(s): POCGLU, POCNA, POCK, POCCL, POCBUN, POCHEMO, POCHCT in the last 72 hours.     Coags: No results found for: PROTIME, INR, APTT    HCG (If Applicable): No results found for: PREGTESTUR, PREGSERUM, HCG, HCGQUANT     ABGs: No results found for: PHART, PO2ART, RGW9QEJ, SUU5WER, BEART, T5ZUKEKM     Type & Screen (If Applicable):  No results found for: Jeovanny Gonzales    Drug/Infectious Status (If Applicable):  No results found for: HIV, HEPCAB    COVID-19 Screening (If Applicable): No results found for: COVID19        Anesthesia Evaluation    Airway: Mallampati: II  TM distance: >3 FB   Neck ROM: full  Mouth opening: > = 3 FB   Dental:          Pulmonary:                              Cardiovascular:            Rhythm: regular  Rate: normal                    Neuro/Psych:               GI/Hepatic/Renal:             Endo/Other:                     Abdominal:             Vascular: Other Findings:           Anesthesia Plan      general     ASA 2 - emergent       Induction: intravenous. Anesthetic plan and risks discussed with patient. Plan discussed with CRNA.                     Renee Martínez MD   10/15/2022

## 2022-10-15 NOTE — ED NOTES
Report called to Law Esparza on floor, patient to floor via stretcher.       Milli Dunlap RN  10/15/22 6616

## 2022-10-15 NOTE — H&P
General Surgery   Resident History and Physical Note    CC: RLQ sharp abdominal pain    History of Present Illness:   Kimmy Mesa is a 46 y.o. female with no significant past medical history presents to [facility] with 1 day of sharp abdominal pain that was generalized and has become more localized to the periumbilical region and RLQ. Endorses associated chills. The patient admits to severe nausea and vomiting, worse after pain medication in the ED, she denies fever. She has never had a colonoscopy before, stated she has sigmoidoscopy before related to perineal tear due to vaginal birth years ago. This is the first time she has ever had pain of this nature before. She last had something to eat around 7 pm. Denies hx of inflammatory bowel disease. Denies family hx of colorectal surgery. Past Medical History:    History reviewed. No pertinent past medical history. Past Surgical History:        Procedure Laterality Date    THYROIDECTOMY      partial   Ovarian cystectomy, laparoscopic. Allergies:  Patient has no known allergies. Medications:   Home Meds  No current facility-administered medications on file prior to encounter. No current outpatient medications on file prior to encounter. Current Meds  piperacillin-tazobactam (ZOSYN) 3,375 mg in dextrose 5 % 50 mL IVPB (mini-bag), Once        Family History:   History reviewed. No pertinent family history. Social History:   TOBACCO:   reports that she has never smoked. She has never used smokeless tobacco.  ETOH:   has no history on file for alcohol use. DRUGS:   reports no history of drug use. Review of Systems:   A 14 point review of systems was conducted, significant findings as noted in HPI. All other systems negative.      Physical exam:    Vitals:    10/14/22 2310 10/14/22 2311 10/15/22 0041 10/15/22 0111   BP: 120/82 120/82 124/83 124/77   Pulse: 77      Resp: 19      Temp: 97.6 °F (36.4 °C)      TempSrc: Oral      SpO2: 100%  100% 98% Weight: 135 lb (61.2 kg)      Height: 5' 6\" (1.676 m)          General appearance: alert, no acute distress, grooming appropriate  Eyes: No scleral icterus, EOM grossly intact  Neck: trachea midline, no JVD, no lymphadenopathy, neck supple  Chest/Lungs: Normal effort with no accessory muscle use on RA  Cardiovascular: RRR  Abdomen: Soft, mild tenderness in the RLQ and Infraumbilical region, no rebound, guarding, or rigidity  Skin: warm and dry, no rashes  Extremities: no edema, no cyanosis  Neuro: A&Ox3, no focal deficits, sensation intact    Labs:    CBC:   Recent Labs     10/14/22  2342   WBC 13.1*   HGB 13.3   HCT 40.2   MCV 90.4        BMP:   Recent Labs     10/14/22  2342      K 4.2      CO2 26   BUN 13   CREATININE 0.8     PT/INR: No results for input(s): PROTIME, INR in the last 72 hours. APTT: No results for input(s): APTT in the last 72 hours. Liver Profile: No results found for: AST, ALT, ALB, BILIDIR, BILITOT, ALKPHOS, GGT, 5NUCNo results found for: CHOL, HDL, TRIG  UA:   Lab Results   Component Value Date/Time    COLORU Yellow 10/15/2022 01:02 AM    PHUR 6.5 10/15/2022 01:02 AM    CLARITYU Clear 10/15/2022 01:02 AM    SPECGRAV 1.015 10/15/2022 01:02 AM    LEUKOCYTESUR Negative 10/15/2022 01:02 AM    UROBILINOGEN 0.2 10/15/2022 01:02 AM    BILIRUBINUR Negative 10/15/2022 01:02 AM    BLOODU Negative 10/15/2022 01:02 AM    GLUCOSEU Negative 10/15/2022 01:02 AM       Imaging:   CT ABDOMEN PELVIS W IV CONTRAST Additional Contrast? None   Final Result      1. No sign of any free fluid mass or stone disease appreciated. 2. Appendix has minimal thickening measuring 7.5 mm without obstruction, correlate for early appendicitis   3. No sign of any bowel obstruction   4. Involuting follicle or cyst on the right ovary measuring 2.0 x 1.6 cm               Assessment/Plan:   This is a 46 y.o. female with no significant past medical history presents to [facility] ED with 1 days of sharp abdominal pain that is now localized to her RLQ. CT scan findings concerning for appendicitis without perforation, she has a leukocytosis of 13.1 but is otherwise afebrile and HDS.     - Will admit the patient to the surgical service  - Will plan for laparoscopic appendectomy in the morning  - Patient to remain NPO at this time  - IV pain medication for pain control  - IV Zosyn  - IVF for hydration  - Pregnancy test - negative  - Consent obtained and placed in the main OR desk  - Patient will be discussed with Dr. Solis Cook DO  10/15/22  2:15 AM

## 2022-10-15 NOTE — ANESTHESIA POSTPROCEDURE EVALUATION
Department of Anesthesiology  Postprocedure Note    Patient: Gabino Cordova  MRN: [id number]  YOB: 1980  Date of evaluation: 10/15/2022      Procedure Summary     Date: 10/15/22 Room / Location: [address] [place] [address]    Anesthesia Start: 9850 Anesthesia Stop: 0812    Procedure: APPENDECTOMY LAPAROSCOPIC (Abdomen) Diagnosis: Acute appendicitis, unspecified acute appendicitis type    Surgeons: Geovany Aleman MD Responsible Provider: Earnstine Saint, MD    Anesthesia Type: general ASA Status: 2 - Emergent          Anesthesia Type: No value filed.     Garth Phase I:      Garth Phase II:        Anesthesia Post Evaluation    Patient location during evaluation: PACU  Level of consciousness: awake  Complications: no  Multimodal analgesia pain management approach

## 2022-10-15 NOTE — OP NOTE
OPERATIVE NOTE     Mackenzie Alford  1980  [id number]  [facility]    DATE OF PROCEDURE:  10/15/22    SURGEON:  GLENIS Cardenas, PGY-3 resident. PREOPERATIVE DIAGNOSIS:  Acute appendicitis. POSTOPERATIVE DIAGNOSIS:  Acute appendicitis. PROCEDURE:  Laparoscopic appendectomy. ANESTHESIA:  General endotracheal.    COMPLICATIONS:  None. SPECIMEN:  Appendix. EBL: < 5 cc    FINDINGS:  Acute appendicitis. INDICATIONS:  The patient is a 71-year-old female who presented with approximately 1 day of pain. Patient was examined and imaging and lab findings were reviewed, which confirmed acute appendicitis. Patient was booked for laparoscopic appendectomy. I explained to patient as well as any present family, the risks, benefits, and alternatives of the procedure, including but not limited to the risks of bleeding, infection, abscess formation, staple line breakdown, fistula, hernia, re-operation, damage to intraabdominal structures including but not limited to: bowel, bladder, ureter, and other neurovascular structures. They understood the potential need for open operation or bowel resection. I answered all questions from the patient and available family regarding perioperative expectations and time away from work. Patient was consented to proceed. PROCEDURE DETAILS:  The patient was brought to operating room, placed supine  on the operating table. General endotracheal intubation was performed by anesthesiology. The left arm was padded and tucked. Kumar catheter was placed revealing clear yellow urine. The patient's abdomen was prepped and draped in usual sterile fashion using chlorhexidine prep solution. A timeout was performed confirming the patient's identity as well as the operative site. Antibiotics were confirmed to be  perfusing. All safety points were followed. SCDs were on and were functioning. Chemical DVT prophylaxis was confirmed to be given.     A mix of 1% lidocaine and 0.5% bupivacaine with epinephrine was used to anesthetize the skin at Salazar's point in the left upper quadrant, 2 fingerbreadth below the costal margin. An 11-blade scalpel was used to make a 5 mm incision. The 0 degree 5 mm laparoscope was used to enter the abdominal cavity using Optiview technique. No complications with abdominal entry. The abdomen was insufflated to 15 mm Hg. The patient was placed in slight Trendelenburg and right sided up positioning. Other ports were then placed under visualization, including a 12 mm port in the left lower quadrant, and a 5mm port in the suprapubic region. Using atraumatic graspers we did follow the taeniae coli of the cecum proximally to the appendix, which was then grasped and elevated. It appeared to be thickened and inflamed, consistent with acute appendicitis. There was no evidence of rupture. The Ligasure device was then used to take down some of the peritoneal attachments of the appendix as well as the cecum. The ligature device was then used to dissect into the mesoappendix. A Jo  grasper was then used to create a window between the mesoappendix and the base  of the appendix, and the ligature was used to incise the remainder of the mesoappendix, which was hemostatic. The [place] stapler, 45mm blue load was then used to staple across the base of the appendix flush with the cecum. We did confirm that there were no other structures near the staple line. A stapler was then fired in the  usual manner. The stapler was then removed and the Endocatch retrieval bag was placed into the abdominal cavity. The appendix was placed into the retrieval bag and brought out through the 12 mm left lower quadrant incision. This was passed off the table and will be sent down to pathology for permanent examination. We then inspected all 4 quadrants. There was some inflammatory versus physiologic fluid in the pelvis that was suctioned out. There was a large right ovarian cyst that was photographed. There were no other gross abnormalities noted. The staple line was inspected once again, which appeared to be hemostatic. Omentum was brought down to lie on top of the staple line. The 12mm port site fascia was closed using an 0 vicryl suture passer device. The remaining trocars were discontinued under direct visualization with no bleeding noted at the abdominal wall. Abdomen was desufflated. All incisions were irrigated with saline, and the skin was closed using 4-0 Monocryl in subcuticular fashion. The skin was then cleaned and dried and Dermabond was used for sterile dressing. The patient tolerated the procedure well, was extubated and brought to PACU in stable condition. All counts were correct x 2 at the end of the procedure. No complications.

## 2022-10-15 NOTE — PROGRESS NOTES
Pt received from OR to PACU # 9 via bed. Post:  Appendectomy     Report received from Dr. Orlando Petty and Dr. Leonora Olivera. Per report pt did very well. Respirations reg and easy. Pt is drowsy. Attached to PACU monitoring system. Alarms and parameters set    Pain none noted and no complaints of nausea.

## 2022-10-15 NOTE — PROGRESS NOTES
PACU Transfer to 5311    Vitals:    10/15/22 1015   BP: 111/73   Pulse: 70   Resp: 14   Temp: 97 °F (36.1 °C)   SpO2: 99%         Intake/Output Summary (Last 24 hours) at 10/15/2022 1021  Last data filed at 10/15/2022 4238  Gross per 24 hour   Intake 1000 ml   Output 225 ml   Net 775 ml       Pain assessment:  present - adequately treated  Pain Level: 3    Patient transferred to care of [address].

## 2022-10-15 NOTE — DISCHARGE SUMMARY
Discharge Summary      Patient:  Tonia Mariscal    Admit Date: 10/14/2022 11:05 PM    Discharge Date: 10/15/2022  4:25 PM    Admitting Physician: Alison Montejo MD     Discharge Physician: same    Admitting Diagnosis:  Acute appendicitis     Discharge Diagnosis: same     History reviewed. No pertinent past medical history. Indication for Admission:   Pt presented to [facility] ED for 1 day of sharp abdominal pain that was worked up via CT showing showing appendicitis without rupture and leukocytosis. Hospital Course:   Pt presented to [facility] ED late in the evening on 10/14/22 with complaints of acute abdominal pain. The pt was seen by our service and determined to have acute appendicitis. Pt underwent a laparoscopic appendectomy on 10/15/22. During the pt's post-op check, the pt had indicated her pain was well controlled, she was ambulating, had no nausea/vomiting, voiding, and comfortable with being discharged. As such, she was then discharged on 10/15.      Procedures:  Laparoscopic appendectomy    Consulting services:  None    Discharge physical exam:  General appearance: no acute distress, pleasant demeanor   Eyes: No scleral icterus, EOM grossly intact  Neck: trachea midline, no JVD,  Chest/Lungs: Normal effort with no accessory muscle use on RA  Cardiovascular: RRR  Abdomen: Soft, appropriately-tender, incision c/d/i and well approximated with Dermabond  Skin: warm and dry, no rashes  Extremities: no edema, no cyanosis  Neuro: A&Ox3, no focal deficits, sensation intact    Disposition:  home    Condition at discharge:  Stable    Discharge Instructions:  See separate form    Patient Instructions:      Medication List        START taking these medications      ondansetron 4 MG disintegrating tablet  Commonly known as: Zofran ODT  Take 1 tablet by mouth every 8 hours as needed for Nausea     oxyCODONE 5 MG immediate release tablet  Commonly known as: ROXICODONE  Take 0.5 tablets by mouth every 6 hours as needed for Pain for up to 5 days.                Where to Get Your Medications        You can get these medications from any pharmacy    Bring a paper prescription for each of these medications  ondansetron 4 MG disintegrating tablet  oxyCODONE 5 MG immediate release tablet         Arabella Rodriguez DO   PGY1, General Surgery  10/15/22  6:45 PM  Pager # [phone]

## (undated) DEVICE — 40583 XL ADVANCED TRENDELENBURG POSITIONING KIT: Brand: 40583 XL ADVANCED TRENDELENBURG POSITIONING KIT

## (undated) DEVICE — GLOVE SURG SZ 7 CRM LTX FREE POLYISOPRENE POLYMER BEAD ANTI

## (undated) DEVICE — GENERAL LAPAROSCOPIC: Brand: MEDLINE INDUSTRIES, INC.

## (undated) DEVICE — Device

## (undated) DEVICE — SUTURE VCRL SZ 0 L54IN ABSRB VLT W/O NDL POLYGLACTIN 910 J616H

## (undated) DEVICE — TOWEL,STOP FLAG GOLD N-W: Brand: MEDLINE

## (undated) DEVICE — APPLICATOR MEDICATED 26 CC SOLUTION HI LT ORNG CHLORAPREP

## (undated) DEVICE — TISSUE RETRIEVAL SYSTEM: Brand: INZII RETRIEVAL SYSTEM

## (undated) DEVICE — SOLUTION INJ LR VISIV 1000ML BG

## (undated) DEVICE — SOLUTION ANTIFOG VIS SYS CLEARIFY LAPSCP

## (undated) DEVICE — TROCAR: Brand: KII FIOS FIRST ENTRY

## (undated) DEVICE — DRAPE,LAP,CHOLE,W/TROUGHS,STERILE: Brand: MEDLINE

## (undated) DEVICE — SUTURE MCRYL SZ 4-0 L27IN ABSRB UD L19MM PS-2 1/2 CIR PRIM Y426H